# Patient Record
Sex: FEMALE | URBAN - METROPOLITAN AREA
[De-identification: names, ages, dates, MRNs, and addresses within clinical notes are randomized per-mention and may not be internally consistent; named-entity substitution may affect disease eponyms.]

---

## 2023-06-16 ENCOUNTER — HOSPITAL ENCOUNTER (OUTPATIENT)
Dept: MAMMOGRAPHY | Age: 63
Discharge: HOME | End: 2023-06-18
Payer: COMMERCIAL

## 2023-06-16 DIAGNOSIS — Z12.31 OTHER SCREENING MAMMOGRAM: ICD-10-CM

## 2023-06-16 DIAGNOSIS — M81.0 POSTMENOPAUSAL OSTEOPOROSIS: ICD-10-CM

## 2023-06-16 PROBLEM — J30.2 SEASONAL ALLERGIES: Status: ACTIVE | Noted: 2023-06-16

## 2023-06-16 PROCEDURE — 77063 BREAST TOMOSYNTHESIS BI: CPT

## 2023-06-16 PROCEDURE — 77080 DXA BONE DENSITY AXIAL: CPT

## 2023-07-10 ENCOUNTER — HOSPITAL ENCOUNTER (OUTPATIENT)
Age: 63
Setting detail: SPECIMEN
Discharge: HOME | End: 2023-07-10

## 2023-07-10 DIAGNOSIS — Z00.00 PREVENTATIVE HEALTH CARE: ICD-10-CM

## 2023-07-10 LAB
ERYTHROCYTE [DISTWIDTH] IN BLOOD BY AUTOMATED COUNT: 13.2 % (ref 11.8–14.4)
EST. AVERAGE GLUCOSE BLD GHB EST-MCNC: 91 MG/DL
HBA1C MFR BLD: 4.8 % (ref 4–6)
HCT VFR BLD AUTO: 42.9 % (ref 36.3–47.1)
HGB BLD-MCNC: 13.9 G/DL (ref 11.9–15.1)
MCH RBC QN AUTO: 28.1 PG (ref 25.2–33.5)
MCHC RBC AUTO-ENTMCNC: 32.4 G/DL (ref 28.4–34.8)
MCV RBC AUTO: 86.8 FL (ref 82.6–102.9)
NRBC BLD-RTO: 0 PER 100 WBC
PLATELET # BLD AUTO: 316 K/UL (ref 138–453)
PMV BLD AUTO: 9.1 FL (ref 8.1–13.5)
RBC # BLD AUTO: 4.94 M/UL (ref 3.95–5.11)
WBC OTHER # BLD: 4.7 K/UL (ref 3.5–11.3)

## 2023-07-11 LAB
ALBUMIN SERPL-MCNC: 4.4 G/DL (ref 3.5–5.2)
ALBUMIN/GLOB SERPL: 1.6 {RATIO} (ref 1–2.5)
ALP SERPL-CCNC: 72 U/L (ref 35–104)
ALT SERPL-CCNC: 9 U/L (ref 5–33)
ANION GAP SERPL CALCULATED.3IONS-SCNC: 14 MMOL/L (ref 9–17)
AST SERPL-CCNC: 15 U/L
BILIRUB SERPL-MCNC: 0.7 MG/DL (ref 0.3–1.2)
BUN SERPL-MCNC: 17 MG/DL (ref 8–23)
CALCIUM SERPL-MCNC: 9.4 MG/DL (ref 8.6–10.4)
CHLORIDE SERPL-SCNC: 101 MMOL/L (ref 98–107)
CHOLEST SERPL-MCNC: 196 MG/DL
CHOLESTEROL/HDL RATIO: 3.7
CO2 SERPL-SCNC: 25 MMOL/L (ref 20–31)
CREAT SERPL-MCNC: 0.6 MG/DL (ref 0.5–0.9)
GFR SERPL CREATININE-BSD FRML MDRD: >60 ML/MIN/1.73M2
GLUCOSE SERPL-MCNC: 81 MG/DL (ref 70–99)
HDLC SERPL-MCNC: 53 MG/DL
LDLC SERPL CALC-MCNC: 129 MG/DL (ref 0–130)
POTASSIUM SERPL-SCNC: 4.1 MMOL/L (ref 3.7–5.3)
PROT SERPL-MCNC: 7.2 G/DL (ref 6.4–8.3)
SODIUM SERPL-SCNC: 140 MMOL/L (ref 135–144)
TRIGL SERPL-MCNC: 68 MG/DL

## 2023-08-18 ENCOUNTER — OFFICE VISIT (OUTPATIENT)
Dept: FAMILY MEDICINE CLINIC | Age: 63
End: 2023-08-18
Payer: COMMERCIAL

## 2023-08-18 VITALS
DIASTOLIC BLOOD PRESSURE: 80 MMHG | RESPIRATION RATE: 16 BRPM | HEART RATE: 80 BPM | TEMPERATURE: 97.6 F | BODY MASS INDEX: 29.93 KG/M2 | SYSTOLIC BLOOD PRESSURE: 120 MMHG | WEIGHT: 184 LBS | OXYGEN SATURATION: 98 %

## 2023-08-18 DIAGNOSIS — M67.449 GANGLION CYST OF JOINT OF FINGER: Primary | ICD-10-CM

## 2023-08-18 PROCEDURE — 99213 OFFICE O/P EST LOW 20 MIN: CPT | Performed by: NURSE PRACTITIONER

## 2023-08-18 ASSESSMENT — ENCOUNTER SYMPTOMS: COLOR CHANGE: 1

## 2023-08-18 NOTE — PROGRESS NOTES
improve. - pt verbalized understanding plan of care. Medications, labs, diagnostic studies, consultations and follow-up as documented in this encounter. Rest of systems unchanged, continue current treatments    On this date 8/18/2023 I have spent 20 minutes reviewing previous notes, test results and face to face with the patient discussing the diagnosis and importance of compliance with the treatment plan as well as documenting on the day of the visit.     Brianne Case, RADHA-CNP

## 2024-04-21 NOTE — PROGRESS NOTES
Chloe Peck, APRN-CNP  [facility]  [facility] MEDICINE  [address], [address]  [facility] [address]  Dept: [phone]  Dept Fax: [phone]     Patient ID: Renetta Beasley is a 63 y.o. female Established patient    HPI    Pt here today for an acute visit secondary to right hip pain for a month but is worsening over the last 3 weeks. She denies any injury or illness. She notice it hurts more after sitting for a long time period of time, she will take advil once a day and it will be good, it is more painful in the morning sometimes and hurts to lay on that side. She does notice pain and pressure when she is stepping up with her right foot and putting pressure on it. She does notice with certain shoes it is worse. For instance she wore heels the other day and it really bothered her after that.     Pt denies any fever or chills.  Pt today denies any HA, chest pain, or SOB.  Pt denies any N/V/D/C or abdominal pain.    Otherwise pt doing well on current tx and no other concerns today.     The patient's past medical, surgical, social, and family history as well as his current medications and allergies were reviewed as documented in today's encounter by ISABEL Damon.      Previous office notes, labs, imaging and hospital records were reviewed prior to and during encounter.    No current outpatient medications on file prior to visit.     No current facility-administered medications on file prior to visit.        Subjective:     Review of Systems   Musculoskeletal:  Positive for arthralgias (right hip pain).     Vitals:    04/22/24 0927   BP: 110/74   Pulse: 76   Resp: 16   Temp: 97.8 °F (36.6 °C)   SpO2: 98%       Objective:     Physical Exam  Vitals reviewed.   Constitutional:       Appearance: Normal appearance.   Musculoskeletal:      Right hip: Normal.      Left hip: Tenderness present. No deformity, lacerations, bony tenderness or crepitus. Normal range of motion

## 2024-04-22 ENCOUNTER — OFFICE VISIT (OUTPATIENT)
Dept: FAMILY MEDICINE CLINIC | Age: 64
End: 2024-04-22
Payer: COMMERCIAL

## 2024-04-22 VITALS
SYSTOLIC BLOOD PRESSURE: 110 MMHG | OXYGEN SATURATION: 98 % | RESPIRATION RATE: 16 BRPM | WEIGHT: 184 LBS | HEART RATE: 76 BPM | DIASTOLIC BLOOD PRESSURE: 74 MMHG | BODY MASS INDEX: 29.93 KG/M2 | TEMPERATURE: 97.8 F

## 2024-04-22 DIAGNOSIS — M25.551 RIGHT HIP PAIN: Primary | ICD-10-CM

## 2024-04-22 PROCEDURE — 99213 OFFICE O/P EST LOW 20 MIN: CPT | Performed by: NURSE PRACTITIONER

## 2024-04-22 RX ORDER — PREDNISONE 10 MG/1
TABLET ORAL
Qty: 18 TABLET | Refills: 0 | Status: SHIPPED | OUTPATIENT
Start: 2024-04-22 | End: 2024-05-02

## 2024-04-22 ASSESSMENT — PATIENT HEALTH QUESTIONNAIRE - PHQ9
2. FEELING DOWN, DEPRESSED OR HOPELESS: NOT AT ALL
SUM OF ALL RESPONSES TO PHQ QUESTIONS 1-9: 0
SUM OF ALL RESPONSES TO PHQ9 QUESTIONS 1 & 2: 0
SUM OF ALL RESPONSES TO PHQ QUESTIONS 1-9: 0
1. LITTLE INTEREST OR PLEASURE IN DOING THINGS: NOT AT ALL

## 2024-06-17 ENCOUNTER — OFFICE VISIT (OUTPATIENT)
Dept: FAMILY MEDICINE CLINIC | Age: 64
End: 2024-06-17
Payer: COMMERCIAL

## 2024-06-17 VITALS
BODY MASS INDEX: 29.73 KG/M2 | WEIGHT: 185 LBS | RESPIRATION RATE: 16 BRPM | SYSTOLIC BLOOD PRESSURE: 106 MMHG | TEMPERATURE: 97 F | HEIGHT: 66 IN | HEART RATE: 54 BPM | DIASTOLIC BLOOD PRESSURE: 74 MMHG | OXYGEN SATURATION: 98 %

## 2024-06-17 DIAGNOSIS — Z00.00 PREVENTATIVE HEALTH CARE: Primary | ICD-10-CM

## 2024-06-17 DIAGNOSIS — Z12.31 SCREENING MAMMOGRAM FOR BREAST CANCER: ICD-10-CM

## 2024-06-17 DIAGNOSIS — M81.0 AGE-RELATED OSTEOPOROSIS WITHOUT CURRENT PATHOLOGICAL FRACTURE: ICD-10-CM

## 2024-06-17 DIAGNOSIS — R14.0 ABDOMINAL BLOATING: ICD-10-CM

## 2024-06-17 DIAGNOSIS — M25.551 RIGHT HIP PAIN: ICD-10-CM

## 2024-06-17 PROCEDURE — 99396 PREV VISIT EST AGE 40-64: CPT | Performed by: NURSE PRACTITIONER

## 2024-06-17 SDOH — ECONOMIC STABILITY: FOOD INSECURITY: WITHIN THE PAST 12 MONTHS, YOU WORRIED THAT YOUR FOOD WOULD RUN OUT BEFORE YOU GOT MONEY TO BUY MORE.: NEVER TRUE

## 2024-06-17 SDOH — ECONOMIC STABILITY: HOUSING INSECURITY
IN THE LAST 12 MONTHS, WAS THERE A TIME WHEN YOU DID NOT HAVE A STEADY PLACE TO SLEEP OR SLEPT IN A SHELTER (INCLUDING NOW)?: NO

## 2024-06-17 SDOH — ECONOMIC STABILITY: FOOD INSECURITY: WITHIN THE PAST 12 MONTHS, THE FOOD YOU BOUGHT JUST DIDN'T LAST AND YOU DIDN'T HAVE MONEY TO GET MORE.: NEVER TRUE

## 2024-06-17 SDOH — ECONOMIC STABILITY: INCOME INSECURITY: HOW HARD IS IT FOR YOU TO PAY FOR THE VERY BASICS LIKE FOOD, HOUSING, MEDICAL CARE, AND HEATING?: NOT HARD AT ALL

## 2024-06-17 ASSESSMENT — ENCOUNTER SYMPTOMS
NAUSEA: 0
BACK PAIN: 0
DIARRHEA: 0
COUGH: 0
ABDOMINAL DISTENTION: 1
SORE THROAT: 0
RHINORRHEA: 0
COLOR CHANGE: 0
ABDOMINAL PAIN: 0
CONSTIPATION: 1
VOMITING: 0
SHORTNESS OF BREATH: 0
CHEST TIGHTNESS: 0

## 2024-06-17 NOTE — PATIENT INSTRUCTIONS
can also have small amounts of artichoke hearts (from can, 1 oz), carrots, corn (½ cob), and sweet potato (½ cup).  Avoid: Artichokes, asparagus, [place] sprouts, joss cabbage, cauliflower, and celery. And avoid garlic, leeks, mushrooms, okra, onions, scallions (white part), shallots, and peas.  Fruits  Okay to eat: Bananas, blueberries, cantaloupe, grapes, and honeydew. Kiwi, zaid, limes, oranges, passion fruit, papaya, and pineapple are also okay. You can eat plantain, raspberries, rhubarb, star fruit, strawberries, tangelo, and tangerine. You can also have small amounts of dried banana chips (up to 10 chips), dried cranberries (1 Tbsp), and shredded coconut (up to ¼ cup).  Avoid: Apples, applesauce, apricots, avocados, blackberries, boysenberries, and cherries. Also avoid dates, figs, grapefruit, guava, lychee, and mangoes. Don't eat nectarines, peaches, pears, persimmon, plums, prunes, tamarillo, or watermelon. And limit most canned and dried fruits.  Oils, spices, condiments, and sweeteners  Okay to eat: Vegetable oils (including garlic infused), butter, ghee, lard, and margarine. You can have most fresh herbs like basil, chives, coriander, baljinder, parsley, rosemary, and thyme. You can have salt, jams made from low-FODMAP fruits, mayonnaise, and mustard. Soy sauce, hot sauce (no garlic), tamari, and vinegar are also okay. Sweeteners that are okay include sugar (sucrose), powdered (confectioner's) sugar, brown sugar, glucose, and maple syrup. You can also have some artificial sweeteners like aspartame, saccharine, and stevia.  Avoid: Chutneys, hummus, jellies, garlic sauces, and gravies made with onion or garlic. Avoid pickles, relish, some salad dressings and soup stocks, salsa, and tomato paste. And avoid sauces and other foods with high fructose corn syrup, honey, molasses, and agave. Avoid artificial sweeteners (isomalt, mannitol, malitol, sorbitol, and xylitol). Avoid corn syrup solids, fructose, fruit

## 2024-06-17 NOTE — PROGRESS NOTES
Chloe Peck, RADHA-CNP  [facility] MEDICINE  [address], [address]  [facility] [address]  Dept: [phone]  Dept Fax: [phone]     Patient ID: Renetta Beasley is a 63 y.o. female.    HPI    Renetta Beasley is a 63 y.o. female established patient presents for regular check-up and review of labs. Today, patient complains of some abdominal bloating and constipation after something she ate last week, it seems to be improving some, she notice that certain foods will make it worse, she had a good BM last week but not much this week.  Pt denies any fever or chills.  Pt today denies any HA, chest pain, or SOB.  Pt denies any N/V/D/C or abdominal pain.    Right hip is improving and doing better, she is doing the exercises and she notice if she sits for long periods of time that is when it will bother her more, she thought it would bother her when she was out gardening but it seems like if she moves more it is better.    Preventative care, female:  [facility]   Last mammogram: 6/2023  Dexa Scan: 6/2023  Last PAP: 2023 deleon   Nicotine use: never  Alcohol use: yes   Drug use: never  Dental exam: every 6 months due in July   Eye exam: beginning of 2024 glasses     Specialists:  OB/gyn angelina deleon - will need new one due to insurance change     Previous office notes, labs, imaging and hospital records were reviewed prior to and during encounter.    The patient's past medical, surgical, social, and family history as well as her current medications and allergies were reviewed as documented in today's encounter by ISABEL Damon.      No current outpatient medications on file prior to visit.     No current facility-administered medications on file prior to visit.       Subjective:     Review of Systems   Constitutional:  Negative for activity change, fatigue and fever.   HENT:  Negative for congestion, ear pain, rhinorrhea and sore throat.

## 2024-06-24 ENCOUNTER — HOSPITAL ENCOUNTER (OUTPATIENT)
Age: 64
Setting detail: SPECIMEN
Discharge: HOME | End: 2024-06-24

## 2024-06-24 DIAGNOSIS — Z00.00 PREVENTATIVE HEALTH CARE: ICD-10-CM

## 2024-06-24 LAB
ALBUMIN SERPL-MCNC: 4.4 G/DL (ref 3.5–5.2)
ALBUMIN/GLOB SERPL: 2 {RATIO} (ref 1–2.5)
ALP SERPL-CCNC: 74 U/L (ref 35–104)
ALT SERPL-CCNC: 10 U/L (ref 10–35)
ANION GAP SERPL CALCULATED.3IONS-SCNC: 10 MMOL/L (ref 9–16)
AST SERPL-CCNC: 21 U/L (ref 10–35)
BILIRUB SERPL-MCNC: 0.6 MG/DL (ref 0–1.2)
BUN SERPL-MCNC: 13 MG/DL (ref 8–23)
CALCIUM SERPL-MCNC: 9.4 MG/DL (ref 8.6–10.4)
CHLORIDE SERPL-SCNC: 104 MMOL/L (ref 98–107)
CHOLEST SERPL-MCNC: 173 MG/DL (ref 0–199)
CHOLESTEROL/HDL RATIO: 3
CO2 SERPL-SCNC: 27 MMOL/L (ref 20–31)
CREAT SERPL-MCNC: 0.6 MG/DL (ref 0.5–0.9)
ERYTHROCYTE [DISTWIDTH] IN BLOOD BY AUTOMATED COUNT: 12.9 % (ref 11.8–14.4)
EST. AVERAGE GLUCOSE BLD GHB EST-MCNC: 91 MG/DL
GFR, ESTIMATED: >90 ML/MIN/1.73M2
GLUCOSE SERPL-MCNC: 77 MG/DL (ref 74–99)
HBA1C MFR BLD: 4.8 % (ref 4–6)
HCT VFR BLD AUTO: 40 % (ref 36.3–47.1)
HDLC SERPL-MCNC: 51 MG/DL
HGB BLD-MCNC: 13.1 G/DL (ref 11.9–15.1)
LDLC SERPL CALC-MCNC: 109 MG/DL (ref 0–100)
MCH RBC QN AUTO: 29 PG (ref 25.2–33.5)
MCHC RBC AUTO-ENTMCNC: 32.8 G/DL (ref 28.4–34.8)
MCV RBC AUTO: 88.5 FL (ref 82.6–102.9)
NRBC BLD-RTO: 0 PER 100 WBC
PLATELET # BLD AUTO: 298 K/UL (ref 138–453)
PMV BLD AUTO: 9.8 FL (ref 8.1–13.5)
POTASSIUM SERPL-SCNC: 4.1 MMOL/L (ref 3.7–5.3)
PROT SERPL-MCNC: 6.8 G/DL (ref 6.6–8.7)
RBC # BLD AUTO: 4.52 M/UL (ref 3.95–5.11)
SODIUM SERPL-SCNC: 141 MMOL/L (ref 136–145)
TRIGL SERPL-MCNC: 68 MG/DL
VLDLC SERPL CALC-MCNC: 14 MG/DL
WBC OTHER # BLD: 4.8 K/UL (ref 3.5–11.3)

## 2024-06-28 ENCOUNTER — HOSPITAL ENCOUNTER (OUTPATIENT)
Dept: MAMMOGRAPHY | Age: 64
Discharge: HOME | End: 2024-06-28
Payer: COMMERCIAL

## 2024-06-28 VITALS — HEIGHT: 66 IN | WEIGHT: 178 LBS | BODY MASS INDEX: 28.61 KG/M2

## 2024-06-28 DIAGNOSIS — Z12.31 SCREENING MAMMOGRAM FOR BREAST CANCER: ICD-10-CM

## 2024-06-28 PROCEDURE — 77063 BREAST TOMOSYNTHESIS BI: CPT

## 2025-06-20 ENCOUNTER — HOSPITAL ENCOUNTER (OUTPATIENT)
Age: 65
Setting detail: SPECIMEN
Discharge: HOME | End: 2025-06-20

## 2025-06-20 ENCOUNTER — OFFICE VISIT (OUTPATIENT)
Dept: FAMILY MEDICINE CLINIC | Age: 65
End: 2025-06-20
Payer: COMMERCIAL

## 2025-06-20 VITALS
RESPIRATION RATE: 16 BRPM | OXYGEN SATURATION: 97 % | HEART RATE: 74 BPM | DIASTOLIC BLOOD PRESSURE: 74 MMHG | TEMPERATURE: 97 F | BODY MASS INDEX: 30.51 KG/M2 | WEIGHT: 189 LBS | SYSTOLIC BLOOD PRESSURE: 116 MMHG

## 2025-06-20 DIAGNOSIS — Z00.00 ANNUAL PHYSICAL EXAM: ICD-10-CM

## 2025-06-20 DIAGNOSIS — Z13.1 SCREENING FOR DIABETES MELLITUS: ICD-10-CM

## 2025-06-20 DIAGNOSIS — Z00.00 ANNUAL PHYSICAL EXAM: Primary | ICD-10-CM

## 2025-06-20 DIAGNOSIS — Z12.31 SCREENING MAMMOGRAM FOR BREAST CANCER: ICD-10-CM

## 2025-06-20 DIAGNOSIS — E78.00 ELEVATED LDL CHOLESTEROL LEVEL: ICD-10-CM

## 2025-06-20 DIAGNOSIS — M81.0 AGE-RELATED OSTEOPOROSIS WITHOUT CURRENT PATHOLOGICAL FRACTURE: ICD-10-CM

## 2025-06-20 LAB
ALBUMIN SERPL-MCNC: 4.2 G/DL (ref 3.5–5.2)
ALBUMIN/GLOB SERPL: 1.4 {RATIO} (ref 1–2.5)
ALP SERPL-CCNC: 72 U/L (ref 35–104)
ALT SERPL-CCNC: 13 U/L (ref 10–35)
ANION GAP SERPL CALCULATED.3IONS-SCNC: 9 MMOL/L (ref 9–16)
AST SERPL-CCNC: 16 U/L (ref 10–35)
BILIRUB SERPL-MCNC: 0.6 MG/DL (ref 0–1.2)
BUN SERPL-MCNC: 16 MG/DL (ref 8–23)
CALCIUM SERPL-MCNC: 9.6 MG/DL (ref 8.6–10.4)
CHLORIDE SERPL-SCNC: 103 MMOL/L (ref 98–107)
CHOLEST SERPL-MCNC: 189 MG/DL (ref 0–199)
CHOLESTEROL/HDL RATIO: 3.6
CO2 SERPL-SCNC: 27 MMOL/L (ref 20–31)
CREAT SERPL-MCNC: 0.6 MG/DL (ref 0.6–0.9)
ERYTHROCYTE [DISTWIDTH] IN BLOOD BY AUTOMATED COUNT: 13.2 % (ref 11.8–14.4)
EST. AVERAGE GLUCOSE BLD GHB EST-MCNC: 91 MG/DL
GFR, ESTIMATED: >90 ML/MIN/1.73M2
GLUCOSE SERPL-MCNC: 96 MG/DL (ref 74–99)
HBA1C MFR BLD: 4.8 % (ref 4–6)
HCT VFR BLD AUTO: 40.9 % (ref 36.3–47.1)
HDLC SERPL-MCNC: 52 MG/DL
HGB BLD-MCNC: 13.4 G/DL (ref 11.9–15.1)
LDLC SERPL CALC-MCNC: 124 MG/DL (ref 0–100)
MCH RBC QN AUTO: 28.7 PG (ref 25.2–33.5)
MCHC RBC AUTO-ENTMCNC: 32.8 G/DL (ref 28.4–34.8)
MCV RBC AUTO: 87.6 FL (ref 82.6–102.9)
NRBC BLD-RTO: 0 PER 100 WBC
PLATELET # BLD AUTO: 312 K/UL (ref 138–453)
PMV BLD AUTO: 9.9 FL (ref 8.1–13.5)
POTASSIUM SERPL-SCNC: 4.1 MMOL/L (ref 3.7–5.3)
PROT SERPL-MCNC: 7.1 G/DL (ref 6.6–8.7)
RBC # BLD AUTO: 4.67 M/UL (ref 3.95–5.11)
SODIUM SERPL-SCNC: 139 MMOL/L (ref 136–145)
TRIGL SERPL-MCNC: 64 MG/DL
VLDLC SERPL CALC-MCNC: 13 MG/DL (ref 1–30)
WBC OTHER # BLD: 5.4 K/UL (ref 3.5–11.3)

## 2025-06-20 PROCEDURE — 99396 PREV VISIT EST AGE 40-64: CPT | Performed by: NURSE PRACTITIONER

## 2025-06-20 SDOH — ECONOMIC STABILITY: FOOD INSECURITY: WITHIN THE PAST 12 MONTHS, YOU WORRIED THAT YOUR FOOD WOULD RUN OUT BEFORE YOU GOT MONEY TO BUY MORE.: NEVER TRUE

## 2025-06-20 SDOH — ECONOMIC STABILITY: FOOD INSECURITY: WITHIN THE PAST 12 MONTHS, THE FOOD YOU BOUGHT JUST DIDN'T LAST AND YOU DIDN'T HAVE MONEY TO GET MORE.: NEVER TRUE

## 2025-06-20 ASSESSMENT — ENCOUNTER SYMPTOMS
SORE THROAT: 0
DIARRHEA: 0
COLOR CHANGE: 0
ABDOMINAL PAIN: 0
NAUSEA: 0
SHORTNESS OF BREATH: 0
RHINORRHEA: 0
VOMITING: 0
BACK PAIN: 0
ABDOMINAL DISTENTION: 0
CONSTIPATION: 0
CHEST TIGHTNESS: 0
COUGH: 0

## 2025-06-20 ASSESSMENT — PATIENT HEALTH QUESTIONNAIRE - PHQ9
SUM OF ALL RESPONSES TO PHQ QUESTIONS 1-9: 0
1. LITTLE INTEREST OR PLEASURE IN DOING THINGS: NOT AT ALL
SUM OF ALL RESPONSES TO PHQ QUESTIONS 1-9: 0
2. FEELING DOWN, DEPRESSED OR HOPELESS: NOT AT ALL

## 2025-06-20 NOTE — PROGRESS NOTES
RADHA Bergman-CNP  [facility]  [address], [address]  [facility] [address]  Dept: [phone]  Dept Fax: [phone]     Patient ID: Renetta Beasley is a 64 y.o. female.    HPI    Renetta Beasley is a 64 y.o. female established patient presents for regular check-up     History of Present Illness  The patient presents for a physical exam.    She reports a general sense of well-being with no current health concerns. She has not had a recent consultation with Dr. Melendez. Her last DEXA scan and mammogram were conducted in 2023. She received a letter regarding her mammogram but has not yet scheduled the appointment. Her most recent dental examination was approximately 6 months ago, and her last eye examination was earlier this year, around February or March. She is not experiencing any chest pain, shortness of breath, headaches, dizziness, lightheadedness, abdominal pain, nausea, vomiting, diarrhea, depression, anxiety, or sleep disturbances. Her bowel movements are regular, occurring daily or every other day. She reports no issues with her appetite, although she has noticed some weight gain.    She has been experiencing Raynaud's phenomenon, which appears to have worsened since her initial visit. She has been managing this by ensuring she wears a jacket or gloves in cold environments. She describes episodes where her fingers turn purple, then white, before returning to their normal color. These episodes are occasionally accompanied by mild pain, which she alleviates by immersing her hands in warm water.    She has observed the development of varicose veins on her legs.    Preventative care, female:  Last colonoscopy: 2019[facility]   Last mammogram: 6/2023  Dexa Scan: 6/2023  Last PAP: 4/2023 [address]   Nicotine use: never  Alcohol use: yes, occasional   Drug use: never  Dental exam: every 6 months  Eye exam: glasses she went at beginning of this year

## 2025-06-20 NOTE — PATIENT INSTRUCTIONS
- Get labs done a few days before next appointment. You will want to be fasting which means nothing to eat or drink for 12 hours before completing labs.    - compression stockings 20-30 mmhg to wear daily.      Central scheduling number: [phone] - call to schedule mammogram and DEXA scan    Health Maintenance Recommendations  Exercise   I generally recommend that people of all ages try to get 150 minutes of physical activity per week and it doesn’t matter how this totals up, in other words 30 minutes 5 days per week is as good as 50 minutes 3 days a week and so on.    The level of activity should be such that it is able to get your heart rate up to 100 or more, for example a brisk walk should achieve this rate.   Dietary Recommendations  In terms of diet, I generally recommend trying to eat a healthy well balanced diet full of fruits and vegetables. Avoid carbonated drinks and fruit juices and limit your alcohol use.   Avoid processed foods wherever possible (anything that comes in a can or a box) which can be achieved by sticking to the outside walls of the grocery store where generally you will find fresh fruits/vegetables, meats, dairy, and frozen foods.    Try to avoid starches in the diet where possible and minimize bread, rice, potatoes, and pasta in the diet.  Specifically try to avoid gluten, which even in people that don’t have a dionna allergy, causes havoc in the small intestine and alters absorption of nutrients which can in turn lead to obesity.   Sleep  Try to achieve a regular sleep schedule, waking and laying down at the same time each night.  Most people need 7 hours per night plus or minus 2 hours.    You will know that you’re getting enough because you will wake feeling refreshed and not need to sleep in to catch up on weekends.   Skin Care  Make sure that you don’t neglect your skin.    Play it safe in the sun. Use a sunblock on all of your exposed skin.   The sunblock should be broad

## 2025-06-22 ENCOUNTER — RESULTS FOLLOW-UP (OUTPATIENT)
Dept: FAMILY MEDICINE CLINIC | Age: 65
End: 2025-06-22

## 2025-08-15 ENCOUNTER — HOSPITAL ENCOUNTER (OUTPATIENT)
Dept: MAMMOGRAPHY | Age: 65
Discharge: HOME | End: 2025-08-17
Payer: COMMERCIAL

## 2025-08-15 VITALS — BODY MASS INDEX: 30.51 KG/M2 | WEIGHT: 189 LBS

## 2025-08-15 DIAGNOSIS — M81.0 AGE-RELATED OSTEOPOROSIS WITHOUT CURRENT PATHOLOGICAL FRACTURE: ICD-10-CM

## 2025-08-15 DIAGNOSIS — Z12.31 SCREENING MAMMOGRAM FOR BREAST CANCER: ICD-10-CM

## 2025-08-15 PROCEDURE — 77063 BREAST TOMOSYNTHESIS BI: CPT

## 2025-08-15 PROCEDURE — 77080 DXA BONE DENSITY AXIAL: CPT
